# Patient Record
Sex: MALE | Race: WHITE | NOT HISPANIC OR LATINO | URBAN - METROPOLITAN AREA
[De-identification: names, ages, dates, MRNs, and addresses within clinical notes are randomized per-mention and may not be internally consistent; named-entity substitution may affect disease eponyms.]

---

## 2024-07-29 ENCOUNTER — OFFICE VISIT (OUTPATIENT)
Dept: URGENT CARE | Facility: CLINIC | Age: 44
End: 2024-07-29
Payer: COMMERCIAL

## 2024-07-29 VITALS
OXYGEN SATURATION: 96 % | SYSTOLIC BLOOD PRESSURE: 122 MMHG | DIASTOLIC BLOOD PRESSURE: 78 MMHG | HEIGHT: 69 IN | WEIGHT: 260 LBS | BODY MASS INDEX: 38.51 KG/M2 | RESPIRATION RATE: 20 BRPM | HEART RATE: 90 BPM | TEMPERATURE: 98.5 F

## 2024-07-29 DIAGNOSIS — L08.9 SKIN INFECTION: ICD-10-CM

## 2024-07-29 DIAGNOSIS — S91.309A WOUND OF FOOT: ICD-10-CM

## 2024-07-29 RX ORDER — SULFAMETHOXAZOLE AND TRIMETHOPRIM 800; 160 MG/1; MG/1
1 TABLET ORAL 2 TIMES DAILY
Qty: 10 TABLET | Refills: 0 | Status: SHIPPED | OUTPATIENT
Start: 2024-07-29 | End: 2024-08-03

## 2024-11-27 ENCOUNTER — OFFICE VISIT (OUTPATIENT)
Dept: MEDICAL GROUP | Facility: PHYSICIAN GROUP | Age: 44
End: 2024-11-27
Attending: NURSE PRACTITIONER
Payer: COMMERCIAL

## 2024-11-27 VITALS
HEART RATE: 87 BPM | WEIGHT: 240 LBS | DIASTOLIC BLOOD PRESSURE: 76 MMHG | TEMPERATURE: 98.1 F | RESPIRATION RATE: 16 BRPM | OXYGEN SATURATION: 98 % | HEIGHT: 69 IN | BODY MASS INDEX: 35.55 KG/M2 | SYSTOLIC BLOOD PRESSURE: 124 MMHG
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
## 2024-11-27 DIAGNOSIS — Z11.3 SCREEN FOR STD (SEXUALLY TRANSMITTED DISEASE): ICD-10-CM

## 2024-11-27 DIAGNOSIS — E66.09 CLASS 2 OBESITY DUE TO EXCESS CALORIES WITHOUT SERIOUS COMORBIDITY WITH BODY MASS INDEX (BMI) OF 35.0 TO 35.9 IN ADULT: ICD-10-CM

## 2024-11-27 DIAGNOSIS — Z12.11 SCREENING FOR COLON CANCER: ICD-10-CM

## 2024-11-27 DIAGNOSIS — Z00.00 PHYSICAL EXAM, ANNUAL: ICD-10-CM

## 2024-11-27 DIAGNOSIS — S39.012A REPETITIVE STRAIN INJURY OF LOWER BACK, INITIAL ENCOUNTER: ICD-10-CM

## 2024-11-27 DIAGNOSIS — M25.562 CHRONIC PAIN OF LEFT KNEE: ICD-10-CM

## 2024-11-27 DIAGNOSIS — Z11.59 ENCOUNTER FOR HEPATITIS C SCREENING TEST FOR LOW RISK PATIENT: ICD-10-CM

## 2024-11-27 DIAGNOSIS — G89.29 CHRONIC PAIN OF LEFT KNEE: ICD-10-CM

## 2024-11-27 DIAGNOSIS — E66.812 CLASS 2 OBESITY DUE TO EXCESS CALORIES WITHOUT SERIOUS COMORBIDITY WITH BODY MASS INDEX (BMI) OF 35.0 TO 35.9 IN ADULT: ICD-10-CM

## 2024-11-27 RX ORDER — METHOCARBAMOL 750 MG/1
750 TABLET, FILM COATED ORAL 4 TIMES DAILY
Qty: 40 TABLET | Refills: 0 | Status: SHIPPED | OUTPATIENT
Start: 2024-11-27 | End: 2024-12-07

## 2024-11-27 ASSESSMENT — PATIENT HEALTH QUESTIONNAIRE - PHQ9: CLINICAL INTERPRETATION OF PHQ2 SCORE: 0

## 2024-11-27 NOTE — LETTER
November 27, 2024         Patient: Josh Lara   YOB: 1980   Date of Visit: 11/27/2024           To Whom it May Concern:    Josh Lara was seen in my clinic on 11/27/2024. Please excuse him from work until December 2, 2024.    If you have any questions or concerns, please don't hesitate to call.        Sincerely,           Jori Medrano P.A.-C.  Electronically Signed

## 2024-11-27 NOTE — PROGRESS NOTES
"CC:    Chief Complaint   Patient presents with    [facility] Care     Patient coming in for lower back pain x 1 week        HISTORY OF THE PRESENT ILLNESS: Patient is a 44 y.o. male presenting to [facility] primary care     Pt having back pain that started 10 days ago. Had to miss two days of work. Having some slight improvement this week. Having trouble bending, twisting, squat. Notes that he used to play a lot of sports. Located in lower back mostly on R side. Has been doing some stretching at home. Pain shooting down the R leg. Has been doing heat and ice. Works in industrial maintenance.   Pt having pain in bilateral knees, worse in L.   Pt has been having trouble with losing weight. Has tried several diets and has not had much success.     No problem-specific Assessment & Plan notes found for this encounter.    Allergies: Penicillins    No current [facility]-ordered outpatient medications on file.     No current [facility]-ordered facility-administered medications on file.       No past medical history on file.    No past surgical history on file.    Social History     Tobacco Use    Smoking status: Former     Current packs/day: 0.00     Types: Cigarettes     Quit date: 2012     Years since quittin.3    Smokeless tobacco: Never   Vaping Use    Vaping status: Never Used       Social History     Social History Narrative    Not on file       No family history on file.    ROS:     - Constitutional: Negative for fever, chills, unexpected weight change, and fatigue/generalized weakness.       - Musculoskeletal:Positive back pain.  Negative for myalgias,  and joint pain.        .      Exam: /76   Pulse 87   Temp 36.7 °C (98.1 °F)   Resp 16   Ht 1.753 m (5' 9\")   Wt 109 kg (240 lb)   SpO2 98%  Body mass index is 35.44 kg/m².    General: Normal appearing. No acute distress.  Skin: Warm and dry.  No obvious lesions.  HEENT: Normocephalic. Eyes conjunctiva clear lids without ptosis, ears normal shape and " contour  Cardiovascular: Regular rate and rhythm without murmur.   Respiratory: Clear to auscultation bilaterally, no rhonchi wheezing or rales.  Neurologic: Grossly nonfocal, A&O x3, gait normal,  Musculoskeletal: No deformity or swelling.   Extremities: No extremity cyanosis, clubbing, or edema.  Psych: Normal mood and affect. Judgment and insight is normal.    Please note that this dictation was created using voice recognition software. I have made every reasonable attempt to correct obvious errors, but I expect that there are errors of grammar and possibly content that I did not discover before finalizing the note.      Assessment/Plan    1. Repetitive strain injury of lower back, initial encounter  Start on muscle relaxer. Referral to physical therapy placed.   - methocarbamol (ROBAXIN-750) 750 MG Tab; Take 1 Tablet by mouth 4 times a day for 10 days.  Dispense: 40 Tablet; Refill: 0  - Referral to Physical Therapy    2. Chronic pain of left knee  Will get xray and review at next visit  - DX-KNEE 3 VIEWS Atraumatic Pain/Swelling/Deformity; Future    3. Screening for colon cancer    - Referral to GI for Colonoscopy    4. Screen for STD (sexually transmitted disease)    - HIV AG/AB COMBO ASSAY SCREENING; Future    5. Encounter for hepatitis C screening test for low risk patient    - HEP C VIRUS ANTIBODY; Future    6. Physical exam, annual  Labs printed  - CBC WITH DIFFERENTIAL; Future  - Comp Metabolic Panel; Future  - HEMOGLOBIN A1C; Future  - Lipid Profile; Future  - TSH WITH REFLEX TO FT4; Future    7. Class 2 obesity due to excess calories without serious comorbidity with body mass index (BMI) of 35.0 to 35.9 in adult  Discuss dietary changes at next visit